# Patient Record
Sex: MALE | Race: OTHER | NOT HISPANIC OR LATINO | URBAN - METROPOLITAN AREA
[De-identification: names, ages, dates, MRNs, and addresses within clinical notes are randomized per-mention and may not be internally consistent; named-entity substitution may affect disease eponyms.]

---

## 2018-04-22 ENCOUNTER — EMERGENCY (EMERGENCY)
Facility: HOSPITAL | Age: 33
LOS: 1 days | Discharge: LEFT BEFORE DISCHARGE | End: 2018-04-22
Attending: EMERGENCY MEDICINE
Payer: SELF-PAY

## 2018-04-22 VITALS
DIASTOLIC BLOOD PRESSURE: 75 MMHG | SYSTOLIC BLOOD PRESSURE: 110 MMHG | RESPIRATION RATE: 16 BRPM | HEART RATE: 78 BPM | TEMPERATURE: 99 F | OXYGEN SATURATION: 99 %

## 2018-04-22 PROCEDURE — 99282 EMERGENCY DEPT VISIT SF MDM: CPT

## 2018-04-22 RX ORDER — IBUPROFEN 200 MG
0 TABLET ORAL
Qty: 0 | Refills: 0 | COMMUNITY

## 2018-04-22 NOTE — ED ADULT NURSE NOTE - OBJECTIVE STATEMENT
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  Denies direct trauma to area.  No bruising or swelling noted.  He gets temporary relief with Advil. but pain returns later

## 2018-04-22 NOTE — ED PROVIDER NOTE - CARE PLAN
Principal Discharge DX:	Right arm pain  Assessment and plan of treatment:	Take ibuprofen 600 mg every 8 hours as needed for pain with food and plenty of water  Rest, ice or heat packs as needed for discomfort.  Follow up with your primary doctor within the next 1-2 days  If you have any worsening or changing symptoms such as confusion, loss of consciousness, worsening pain, nausea/vomiting, or if you have any other concerns please return to the emergency department Principal Discharge DX:	Right arm pain

## 2018-04-22 NOTE — ED PROVIDER NOTE - MEDICAL DECISION MAKING DETAILS
Dr. Little: Male patient with no past medical hx of systemic illnesses, brought to ED due to right upper arm pain. No history of trauma to right upper extremity. 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 Patient found in no acute distress, calm, speaking in complete sentences. Physical exam revealed no gross deformities in right upper extremity, and right upper extremity is neurovascularly intact. X-rays ordered to assess for bony deformity, acute fractures/dislocations. Will discharge home with X-rays are unremarkable.

## 2018-04-22 NOTE — ED ADULT NURSE NOTE - CHPI ED SYMPTOMS NEG
no numbness/no stiffness/no deformity/no bruising/no abrasion/no back pain/no weakness/no difficulty bearing weight/no fever/no tingling

## 2018-04-22 NOTE — ED PROVIDER NOTE - CONDUCTED A DETAILED DISCUSSION WITH PATIENT AND/OR GUARDIAN REGARDING, MDM
return to ED if symptoms worsen, persist or questions arise/need for outpatient follow-up/radiology results need for outpatient follow-up/return to ED if symptoms worsen, persist or questions arise

## 2018-04-22 NOTE — ED PROVIDER NOTE - PROGRESS NOTE DETAILS
Patient evaluation and care begun prior to documentation. Patient was evaluated by me, found in no acute distress, calm, speaking in complete sentences. Physical exam revealed no gross deformities in right upper extremity, and right upper extremity is neurovascularly intact. X-rays ordered to assess for bony deformity, acute fractures/dislocations. Will discharge home with X-rays are unremarkable. I agree with the NP's assessment and plan. Dr. Bridger Little Pt walked out of ED without a commend. He was awaiting for x-ray but could not be found in ED.  Pt's seen by me and attending Dr. Little and informed muscles strain and verbal discharge instruction's given prior to x-ray. Pt walked out of ED without a commend. He was awaiting for x-ray but could not be found in ED.  Pt's seen by me and attending Dr. Little. Patient eloped from ED before X-rays were performed. Patient not found at ED. -Dr. Little

## 2018-04-22 NOTE — ED PROVIDER NOTE - OBJECTIVE STATEMENT
33yo male pt, , c/o right dominant upper arm pain 2hour ago. Pt felt pain initially one month ago after heavy lifting and improved with Advil. 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 Denies direct impaction. Denies neck, back or right shoulder pain. Denies sensory change or weakness to extremities.